# Patient Record
Sex: MALE | Race: WHITE
[De-identification: names, ages, dates, MRNs, and addresses within clinical notes are randomized per-mention and may not be internally consistent; named-entity substitution may affect disease eponyms.]

---

## 2018-02-28 ENCOUNTER — HOSPITAL ENCOUNTER (EMERGENCY)
Dept: HOSPITAL 25 - UCCORT | Age: 33
Discharge: HOME | End: 2018-02-28
Payer: MEDICAID

## 2018-02-28 VITALS — DIASTOLIC BLOOD PRESSURE: 88 MMHG | SYSTOLIC BLOOD PRESSURE: 150 MMHG

## 2018-02-28 DIAGNOSIS — L53.9: ICD-10-CM

## 2018-02-28 DIAGNOSIS — R60.9: Primary | ICD-10-CM

## 2018-02-28 PROCEDURE — G0463 HOSPITAL OUTPT CLINIC VISIT: HCPCS

## 2018-02-28 PROCEDURE — 99212 OFFICE O/P EST SF 10 MIN: CPT

## 2018-02-28 NOTE — UC
UC General HPI





- HPI Summary


HPI Summary: 





caretaker took pt shoes off for shower and noted both feet to be swollen and 

red plus L foot warm. denies fever. pt kicks and stomps his feet thus they have 

concern for fx.





- History of Current Complaint


Chief Complaint: UCLowerExtremity


Stated Complaint: FEET SWOLLEN


Time Seen by Provider: 02/28/18 18:25


Hx Obtained From: Family/Caretaker


Timing: Constant


Pain Intensity: 0


Aggravating: nothing


Alleviating: nothing


Associated Signs & Symptoms: Negative: Fever, Vomiting





- Allergy/Home Medications


Allergies/Adverse Reactions: 


 Allergies











Allergy/AdvReac Type Severity Reaction Status Date / Time


 


amoxicillin Allergy  Unknown Verified 02/28/18 18:11





   Reaction  





   Details  


 


cephalexin [From Keflex] Allergy  Unknown Verified 02/28/18 18:11





   Reaction  





   Details  


 


clavulanic acid Allergy  Unknown Verified 02/28/18 18:11





[From Augmentin]   Reaction  





   Details  


 


midazolam Allergy  Unknown Verified 02/28/18 18:11





   Reaction  





   Details  











Home Medications: 


 Home Medications





ALPRAZolam TAB* [Xanax TAB*] 0.5 mg TID 02/28/18 [History Confirmed 02/28/18]


Divalproex DR TAB(*) [Depakote DR TAB(*)] 1 tab BID 02/28/18 [History Confirmed 

02/28/18]


Levothyroxine TAB* [Synthroid 75 MCG TAB*] 75 mcg DAILY 02/28/18 [History 

Confirmed 02/28/18]


Melatonin 10 mg BEDTIME 02/28/18 [History Confirmed 02/28/18]


hydrOXYzine HCL TAB* [Atarax TAB 50 MG *] 50 mg TID 02/28/18 [History Confirmed 

02/28/18]











PMH/Surg Hx/FS Hx/Imm Hx





- Additional Past Medical History


Additional PMH: 





non verbal, MR, autistic


Endocrine History: Hypothyroidism





- Surgical History


Surgical History: Yes


Surgery Procedure, Year, and Place: teeth extraction





- Family History


Known Family History: Positive: Unknown - patient is unable to speak





- Social History


Occupation: Disabled


Lives: Group Home


Alcohol Use: None


Substance Use Type: None


Smoking Status (MU): Never Smoked Tobacco





- Immunization History


Vaccination Up to Date: Yes





Review of Systems


Constitutional: Negative


Skin: Negative


Eyes: Negative


ENT: Negative


Respiratory: Negative


Cardiovascular: Negative


Gastrointestinal: Negative


Genitourinary: Negative


Motor: Negative


Neurovascular: Negative


Musculoskeletal: Negative


Neurological: Negative


Psychological: Negative


Is Patient Immunocompromised?: No


All Other Systems Reviewed And Are Negative: Yes





Physical Exam


Triage Information Reviewed: Yes


Appearance: Well-Appearing


Vital Signs: 


 Initial Vital Signs











Temp  97.8 F   02/28/18 18:19


 


Pulse  97   02/28/18 18:19


 


BP  150/88   02/28/18 18:19


 


Pulse Ox  100   02/28/18 18:19











Vital Signs Reviewed: Yes


Eyes: Positive: Conjunctiva Clear


ENT: Positive: Normal ENT inspection


Neck: Positive: Supple, No Lymphadenopathy


Respiratory: Positive: Lungs clear


Cardiovascular: Positive: RRR, No Murmur


Abdomen Description: Positive: Nontender, No Organomegaly, Soft


Bowel Sounds: Positive: Present


Musculoskeletal: Positive: Other: - Both feet with mild edema and erythema. L 

medial foot is warm. Gross s/v/m intact. no inguinal adenopathy.


Neurological: Positive: Alert


Psychological: Positive: Consolable


Skin Exam: Normal





Diagnostics





- Radiology


  ** No standard instances


Xray Interpretation: No Acute Changes


Radiology Interpretation Completed By: Radiologist





Course/Dx





- Course


Course Of Treatment: mild sts both feet. no fx's or osteo on xray. no concern 

for dvt. since L foot warm, will cover with antibiotic for ? skin infection and 

have f/u Dr Garsia tomorrow for recheck. no concern for dvt.





- Differential Dx - Multi-Symptom


Provider Diagnoses: mild swelling and erythema both feet. possible skin 

infection L foot.





Discharge





- Discharge Plan


Condition: Stable


Disposition: HOME


Prescriptions: 


DOXYcycline CAP(*) [DOXYcycline 100MG CAP(*)] 100 mg PO BID 10 Days #20 cap


Patient Education Materials:  Cellulitis (ED)


Referrals: 


Fareed Conway MD [Primary Care Provider] - 1 Day

## 2018-02-28 NOTE — RAD
INDICATION: Bilateral foot pain     



COMPARISON: Left foot June 12, 2015; right foot April 23, 2015



TECHNIQUE: AP, lateral, and oblique views of each foot were obtained.



FINDINGS: The bony structures, joint spaces, and soft tissues are normal for age

bilaterally.



IMPRESSION: NO ACUTE PLAIN RADIOGRAPHIC ABNORMALITIES OF EITHER FOOT

## 2018-12-11 ENCOUNTER — HOSPITAL ENCOUNTER (EMERGENCY)
Dept: HOSPITAL 25 - UCCORT | Age: 33
Discharge: HOME | End: 2018-12-11
Payer: MEDICAID

## 2018-12-11 VITALS — SYSTOLIC BLOOD PRESSURE: 178 MMHG | DIASTOLIC BLOOD PRESSURE: 89 MMHG

## 2018-12-11 DIAGNOSIS — Z88.0: ICD-10-CM

## 2018-12-11 DIAGNOSIS — Z88.8: ICD-10-CM

## 2018-12-11 DIAGNOSIS — Z88.1: ICD-10-CM

## 2018-12-11 DIAGNOSIS — L03.116: Primary | ICD-10-CM

## 2018-12-11 PROCEDURE — G0463 HOSPITAL OUTPT CLINIC VISIT: HCPCS

## 2018-12-11 PROCEDURE — 99212 OFFICE O/P EST SF 10 MIN: CPT
